# Patient Record
Sex: MALE | Race: WHITE | NOT HISPANIC OR LATINO | Employment: OTHER | URBAN - METROPOLITAN AREA
[De-identification: names, ages, dates, MRNs, and addresses within clinical notes are randomized per-mention and may not be internally consistent; named-entity substitution may affect disease eponyms.]

---

## 2020-02-12 ENCOUNTER — TRANSCRIBE ORDERS (OUTPATIENT)
Dept: ADMINISTRATIVE | Facility: HOSPITAL | Age: 60
End: 2020-02-12

## 2020-02-12 ENCOUNTER — HOSPITAL ENCOUNTER (OUTPATIENT)
Dept: GENERAL RADIOLOGY | Facility: HOSPITAL | Age: 60
Discharge: HOME | End: 2020-02-12
Admitting: RADIOLOGY

## 2020-02-12 DIAGNOSIS — H44.609 RETAINED MAGNETIC INTRAOCULAR FOREIGN BODY, UNSPECIFIED LATERALITY: Primary | ICD-10-CM

## 2020-02-12 PROCEDURE — 70140 X-RAY EXAM OF FACIAL BONES: CPT

## 2025-02-06 RX ORDER — FENOFIBRATE 160 MG/1
160 TABLET ORAL DAILY
Qty: 90 TABLET | Refills: 1 | Status: CANCELLED | OUTPATIENT
Start: 2025-02-06

## 2025-02-11 RX ORDER — FENOFIBRATE 160 MG/1
160 TABLET ORAL DAILY
COMMUNITY
Start: 2025-02-06 | End: 2025-02-11 | Stop reason: SDUPTHER

## 2025-02-11 RX ORDER — FENOFIBRATE 160 MG/1
160 TABLET ORAL DAILY
Qty: 90 TABLET | Refills: 1 | Status: SHIPPED | OUTPATIENT
Start: 2025-02-11

## 2025-06-17 NOTE — PROGRESS NOTES
Cardiology Established Patient Note     Name: Adama Colvin  :   1960  PCP: Provider, No Known  Date:   2025  [place] [facility] [place] [facility]  [address] [address] [facility] [address]  [facility] [address]  Fax [phone]  Phone [phone]    Chief Complaint: Here for check up.   Problem list  CAD          IW STEMI presnting with SCD and prolonged resussitation          Multiple PCIs with several DINORA to RCA  History CABG to distal RCA  Paroxysmal A-fib  Echo 7/10/2019 EF 65 to 70%, mild LVH, mild MR, mild TR, impaired relaxation with normal left atrial pressures  Hypertension  Hyperlipidemia    Subjective     History of Present Illness  Adama Colvin is a 64 y.o. male who presents today for 6-month follow-up.  At last visit, patient was reporting chest pressure- echo and stress test were ordered however patient states that his pain is the same chronic pain he also had in the center of his chest unrelated to activity been present for years and essentially unchanged hard to describe not exactly pressure not exactly sharp.  EKG today shows normal sinus rhythm with right bundle branch block and no significant changes.  He still has occasional retrosternal discomfort with activity 1-3 min. Subsiding at rest.Associated with SOB.    Current Outpatient Medications   Medication Instructions   • fenofibrate 160 mg, Oral, Daily            Objective     Vital Signs:  There were no vitals taken for this visit.  There is no height or weight on file to calculate BMI.       Neck:      Vascular: No carotid bruit or JVD.   Cardiovascular:      PMI at left midclavicular line. Normal rate. Regular rhythm. Normal S1. Normal S2.       Murmurs: There is a grade 1/6 high frequency blowing holosystolic murmur at the apex.      No gallop.  No click. No rub.   Edema:     Peripheral edema absent.   Abdominal:      General: There is no abdominal bruit.   Skin:     General:  Skin is warm.   Feet:      Right foot:      Skin integrity: Skin integrity normal. No ulcer.      Left foot:      Skin integrity: No ulcer.       ECG 12 Lead    Date/Time: 6/18/2025 12:37 PM  Performed by: Chelle Dudley MD    Authorized by: Chelle Dudley MD  Previous ECG: no previous ECG available  Rhythm: sinus rhythm  Rate: normal  Conduction: right bundle branch block  QRS axis: normal  Other: no other findings  Comments: NSR RBBB.         Assessment and Plan     Assessment & Plan  CAD in native artery   Coronary artery disease is stable.  Continue current treatment regimen.  Cardiac status will be reassessed in 6 months.         Angina of effort  CCS 0  Coronary artery disease is stable.  Continue current treatment regimen.  Cardiac status will be reassessed in 6 months.    Orders:  •  ECG 12 Lead; Future    Hyperlipidemia LDL goal <55   Needs lipid panel    Orders:  •  Hepatic Function Panel; Future  •  High Sensitivity CRP; Future  •  Lipoprotein A (LPA); Future  •  Lipid Panel; Future    Dyspnea on exertion  Resolved  Orders:  •  Comprehensive Metabolic Panel; Future  •  Microalbumin / Creatinine Urine Ratio - Urine, Clean Catch; Future      Follow Up  No follow-ups on file.    [facility] Cardiology

## 2025-06-18 ENCOUNTER — LAB (OUTPATIENT)
Facility: HOSPITAL | Age: 65
End: 2025-06-18
Payer: MEDICARE

## 2025-06-18 ENCOUNTER — OFFICE VISIT (OUTPATIENT)
Age: 65
End: 2025-06-18
Payer: MEDICARE

## 2025-06-18 VITALS
SYSTOLIC BLOOD PRESSURE: 102 MMHG | HEIGHT: 70 IN | HEART RATE: 77 BPM | DIASTOLIC BLOOD PRESSURE: 62 MMHG | WEIGHT: 209 LBS | BODY MASS INDEX: 29.92 KG/M2

## 2025-06-18 DIAGNOSIS — E78.5 HYPERLIPIDEMIA LDL GOAL <55: ICD-10-CM

## 2025-06-18 DIAGNOSIS — I25.10 CAD IN NATIVE ARTERY: Primary | ICD-10-CM

## 2025-06-18 DIAGNOSIS — I20.89 ANGINA OF EFFORT: ICD-10-CM

## 2025-06-18 DIAGNOSIS — R06.09 DYSPNEA ON EXERTION: ICD-10-CM

## 2025-06-18 LAB — BILIRUB CONJ SERPL-MCNC: 0.2 MG/DL (ref 0–0.3)

## 2025-06-18 PROCEDURE — 36415 COLL VENOUS BLD VENIPUNCTURE: CPT

## 2025-06-18 PROCEDURE — 86141 C-REACTIVE PROTEIN HS: CPT

## 2025-06-18 PROCEDURE — 80061 LIPID PANEL: CPT

## 2025-06-18 PROCEDURE — 82248 BILIRUBIN DIRECT: CPT

## 2025-06-18 PROCEDURE — 80053 COMPREHEN METABOLIC PANEL: CPT

## 2025-06-18 PROCEDURE — 83695 ASSAY OF LIPOPROTEIN(A): CPT

## 2025-06-18 RX ORDER — ROSUVASTATIN CALCIUM 40 MG/1
40 TABLET, COATED ORAL NIGHTLY
COMMUNITY
Start: 2025-06-01

## 2025-06-18 RX ORDER — CARVEDILOL 25 MG/1
25 TABLET ORAL 2 TIMES DAILY WITH MEALS
COMMUNITY
Start: 2025-03-19 | End: 2025-06-27 | Stop reason: SDUPTHER

## 2025-06-18 RX ORDER — LISINOPRIL 2.5 MG/1
2.5 TABLET ORAL DAILY
COMMUNITY
Start: 2025-03-27 | End: 2025-06-27 | Stop reason: SDUPTHER

## 2025-06-18 RX ORDER — AMLODIPINE BESYLATE 5 MG/1
5 TABLET ORAL DAILY
COMMUNITY
Start: 2025-05-05

## 2025-06-18 RX ORDER — RIVAROXABAN 20 MG/1
20 TABLET, FILM COATED ORAL
COMMUNITY
Start: 2025-06-01

## 2025-06-19 LAB
ALBUMIN SERPL-MCNC: 4.2 G/DL (ref 3.5–5.2)
ALBUMIN/GLOB SERPL: 1.2 G/DL
ALP SERPL-CCNC: 65 U/L (ref 39–117)
ALT SERPL W P-5'-P-CCNC: 18 U/L (ref 1–41)
ANION GAP SERPL CALCULATED.3IONS-SCNC: 14 MMOL/L (ref 5–15)
AST SERPL-CCNC: 29 U/L (ref 1–40)
BILIRUB SERPL-MCNC: 0.5 MG/DL (ref 0–1.2)
BUN SERPL-MCNC: 11 MG/DL (ref 8–23)
BUN/CREAT SERPL: 11.2 (ref 7–25)
CALCIUM SPEC-SCNC: 9.4 MG/DL (ref 8.6–10.5)
CHLORIDE SERPL-SCNC: 101 MMOL/L (ref 98–107)
CHOLEST SERPL-MCNC: 128 MG/DL (ref 0–200)
CO2 SERPL-SCNC: 24 MMOL/L (ref 22–29)
CREAT SERPL-MCNC: 0.98 MG/DL (ref 0.76–1.27)
CRP SERPL-MCNC: 0.27 MG/DL (ref 0.01–0.5)
EGFRCR SERPLBLD CKD-EPI 2021: 86.6 ML/MIN/1.73
GLOBULIN UR ELPH-MCNC: 3.5 GM/DL
GLUCOSE SERPL-MCNC: 98 MG/DL (ref 65–99)
HDLC SERPL-MCNC: 39 MG/DL (ref 40–60)
LDLC SERPL CALC-MCNC: 66 MG/DL (ref 0–100)
LDLC/HDLC SERPL: 1.62 {RATIO}
POTASSIUM SERPL-SCNC: 3.9 MMOL/L (ref 3.5–5.2)
PROT SERPL-MCNC: 7.7 G/DL (ref 6–8.5)
SODIUM SERPL-SCNC: 139 MMOL/L (ref 136–145)
TRIGL SERPL-MCNC: 129 MG/DL (ref 0–150)
VLDLC SERPL-MCNC: 23 MG/DL (ref 5–40)

## 2025-06-20 LAB — LPA SERPL-SCNC: <8.4 NMOL/L

## 2025-06-27 RX ORDER — CARVEDILOL 25 MG/1
25 TABLET ORAL 2 TIMES DAILY WITH MEALS
Qty: 180 TABLET | Refills: 0 | Status: SHIPPED | OUTPATIENT
Start: 2025-06-27 | End: 2025-06-30 | Stop reason: SDUPTHER

## 2025-06-27 RX ORDER — LISINOPRIL 2.5 MG/1
2.5 TABLET ORAL DAILY
Qty: 90 TABLET | Refills: 0 | Status: SHIPPED | OUTPATIENT
Start: 2025-06-27

## 2025-06-27 NOTE — TELEPHONE ENCOUNTER
Rx Refill Note  Requested Prescriptions     Signed Prescriptions Disp Refills    carvedilol (COREG) 25 MG tablet 180 tablet 0     Sig: Take 1 tablet by mouth 2 (Two) Times a Day With Meals.     Authorizing Provider: APRIL BRANDON     Ordering User: EMELIA COOK    lisinopril (PRINIVIL,ZESTRIL) 2.5 MG tablet 90 tablet 0     Sig: Take 1 tablet by mouth Daily.     Authorizing Provider: APRIL BRANDON     Ordering User: EMELIA COOK      Last office visit with prescribing clinician: 6/18/2025   Last telemedicine visit with prescribing clinician: Visit date not found   Next office visit with prescribing clinician: 12/19/2025                        Pharmacy Info    Last Fill Date:  Rx Written Date:   Prescribed Qty:   Additional Details from Pharmacy:    Patient passed protocols per agnieszka.         Emelia Cook MA  06/27/25, 15:39 EDT

## 2025-06-30 RX ORDER — CARVEDILOL 25 MG/1
25 TABLET ORAL 2 TIMES DAILY WITH MEALS
Qty: 180 TABLET | Refills: 0 | Status: SHIPPED | OUTPATIENT
Start: 2025-06-30

## 2025-06-30 NOTE — TELEPHONE ENCOUNTER
Rx Refill Note  Requested Prescriptions     Signed Prescriptions Disp Refills    carvedilol (COREG) 25 MG tablet 180 tablet 0     Sig: Take 1 tablet by mouth 2 (Two) Times a Day With Meals.     Authorizing Provider: APRIL BRANDON     Ordering User: EMELIA COOK      Last office visit with prescribing clinician: 6/18/2025   Last telemedicine visit with prescribing clinician: Visit date not found   Next office visit with prescribing clinician: 12/19/2025                        Pharmacy Info    Last Fill Date:  Rx Written Date:   Prescribed Qty:   Additional Details from Pharmacy:    Patient passed protocols per agnieszka.         Emelia Cook MA  06/30/25, 15:22 EDT

## 2025-07-01 ENCOUNTER — TELEPHONE (OUTPATIENT)
Age: 65
End: 2025-07-01
Payer: MEDICARE

## 2025-07-01 DIAGNOSIS — E78.5 HYPERLIPIDEMIA, UNSPECIFIED HYPERLIPIDEMIA TYPE: Primary | ICD-10-CM

## 2025-07-01 RX ORDER — BEMPEDOIC ACID AND EZETIMIBE 180; 10 MG/1; MG/1
1 TABLET, FILM COATED ORAL DAILY
Qty: 90 TABLET | Refills: 0 | Status: SHIPPED | OUTPATIENT
Start: 2025-07-01

## 2025-07-01 NOTE — TELEPHONE ENCOUNTER
Dr. Dudley would like to add Nexlizet, can you put the order in?    Also a Lipid lab order as well for 3 months?     Patient also needs an appointment in 3 months.